# Patient Record
Sex: FEMALE | Race: BLACK OR AFRICAN AMERICAN | NOT HISPANIC OR LATINO | Employment: FULL TIME | URBAN - METROPOLITAN AREA
[De-identification: names, ages, dates, MRNs, and addresses within clinical notes are randomized per-mention and may not be internally consistent; named-entity substitution may affect disease eponyms.]

---

## 2017-05-19 ENCOUNTER — APPOINTMENT (OUTPATIENT)
Dept: ULTRASOUND IMAGING | Facility: HOSPITAL | Age: 52
End: 2017-05-19

## 2018-02-13 ENCOUNTER — TRANSCRIBE ORDERS (OUTPATIENT)
Dept: ADMINISTRATIVE | Facility: HOSPITAL | Age: 53
End: 2018-02-13

## 2018-02-13 DIAGNOSIS — N63.13 BREAST LUMP ON RIGHT SIDE AT 7 O'CLOCK POSITION: Primary | ICD-10-CM

## 2018-02-23 ENCOUNTER — HOSPITAL ENCOUNTER (OUTPATIENT)
Dept: ULTRASOUND IMAGING | Facility: HOSPITAL | Age: 53
Discharge: HOME | End: 2018-02-23

## 2018-02-23 ENCOUNTER — HOSPITAL ENCOUNTER (OUTPATIENT)
Dept: MAMMOGRAPHY | Facility: HOSPITAL | Age: 53
Discharge: HOME | End: 2018-02-23
Admitting: INTERNAL MEDICINE

## 2018-02-23 DIAGNOSIS — N63.13 BREAST LUMP ON RIGHT SIDE AT 7 O'CLOCK POSITION: ICD-10-CM

## 2018-02-23 PROCEDURE — 76642 ULTRASOUND BREAST LIMITED: CPT | Performed by: RADIOLOGY

## 2018-02-23 PROCEDURE — 77066 DX MAMMO INCL CAD BI: CPT

## 2018-02-23 PROCEDURE — G0279 TOMOSYNTHESIS, MAMMO: HCPCS

## 2018-02-23 PROCEDURE — 77062 BREAST TOMOSYNTHESIS BI: CPT | Performed by: RADIOLOGY

## 2018-02-23 PROCEDURE — 77066 DX MAMMO INCL CAD BI: CPT | Performed by: RADIOLOGY

## 2018-02-23 PROCEDURE — 76642 ULTRASOUND BREAST LIMITED: CPT

## 2018-03-05 ENCOUNTER — TRANSCRIBE ORDERS (OUTPATIENT)
Dept: MAMMOGRAPHY | Facility: HOSPITAL | Age: 53
End: 2018-03-05

## 2018-03-05 DIAGNOSIS — R92.8 ABNORMAL MAMMOGRAM: Primary | ICD-10-CM

## 2018-03-19 ENCOUNTER — TRANSCRIBE ORDERS (OUTPATIENT)
Dept: MAMMOGRAPHY | Facility: HOSPITAL | Age: 53
End: 2018-03-19

## 2018-03-19 DIAGNOSIS — R92.8 ABNORMAL MAMMOGRAM: Primary | ICD-10-CM

## 2018-03-20 ENCOUNTER — HOSPITAL ENCOUNTER (OUTPATIENT)
Dept: ULTRASOUND IMAGING | Facility: HOSPITAL | Age: 53
Discharge: HOME | End: 2018-03-20
Attending: SURGERY | Admitting: SURGERY

## 2018-03-20 ENCOUNTER — HOSPITAL ENCOUNTER (OUTPATIENT)
Dept: ULTRASOUND IMAGING | Facility: HOSPITAL | Age: 53
Discharge: HOME | End: 2018-03-20

## 2018-03-20 DIAGNOSIS — R92.8 ABNORMAL MAMMOGRAM: ICD-10-CM

## 2018-03-20 PROCEDURE — 88189 FLOWCYTOMETRY/READ 16 & >: CPT

## 2018-03-20 PROCEDURE — 88185 FLOWCYTOMETRY/TC ADD-ON: CPT

## 2018-03-20 PROCEDURE — 76942 ECHO GUIDE FOR BIOPSY: CPT

## 2018-03-20 PROCEDURE — 88305 TISSUE EXAM BY PATHOLOGIST: CPT | Performed by: SURGERY

## 2018-03-20 PROCEDURE — 88184 FLOWCYTOMETRY/ TC 1 MARKER: CPT

## 2018-03-20 PROCEDURE — 88112 CYTOPATH CELL ENHANCE TECH: CPT | Performed by: SURGERY

## 2018-03-20 PROCEDURE — 76642 ULTRASOUND BREAST LIMITED: CPT

## 2018-03-20 PROCEDURE — 76642 ULTRASOUND BREAST LIMITED: CPT | Performed by: RADIOLOGY

## 2018-03-20 PROCEDURE — 10022 US GUIDED FINE NEEDLE ASPIRATION: CPT | Performed by: RADIOLOGY

## 2018-03-20 PROCEDURE — 76942 ECHO GUIDE FOR BIOPSY: CPT | Performed by: RADIOLOGY

## 2018-03-20 RX ORDER — LIDOCAINE HYDROCHLORIDE 10 MG/ML
5 INJECTION, SOLUTION INFILTRATION; PERINEURAL ONCE
Status: COMPLETED | OUTPATIENT
Start: 2018-03-20 | End: 2018-03-20

## 2018-03-20 RX ADMIN — LIDOCAINE HYDROCHLORIDE 2 ML: 10 INJECTION, SOLUTION INFILTRATION; PERINEURAL at 14:56

## 2018-03-22 ENCOUNTER — APPOINTMENT (OUTPATIENT)
Dept: PREADMISSION TESTING | Facility: HOSPITAL | Age: 53
End: 2018-03-22

## 2018-03-22 LAB
HCT VFR BLD AUTO: 40.2 % (ref 34.5–44)
LAB AP CASE REPORT: NORMAL
LAB AP FLOW CYTOMETRY SUMMARY: NORMAL
Lab: NORMAL
PATH REPORT.FINAL DX SPEC: NORMAL
POTASSIUM BLD-SCNC: 3.9 MMOL/L (ref 3.5–5.5)

## 2018-03-22 PROCEDURE — 93005 ELECTROCARDIOGRAM TRACING: CPT

## 2018-03-22 PROCEDURE — 93010 ELECTROCARDIOGRAM REPORT: CPT | Performed by: INTERNAL MEDICINE

## 2018-03-22 PROCEDURE — 85014 HEMATOCRIT: CPT | Performed by: ANESTHESIOLOGY

## 2018-03-22 PROCEDURE — 84132 ASSAY OF SERUM POTASSIUM: CPT | Performed by: ANESTHESIOLOGY

## 2018-03-22 PROCEDURE — 36415 COLL VENOUS BLD VENIPUNCTURE: CPT

## 2018-03-23 ENCOUNTER — TELEPHONE (OUTPATIENT)
Dept: MAMMOGRAPHY | Facility: HOSPITAL | Age: 53
End: 2018-03-23

## 2018-03-23 NOTE — TELEPHONE ENCOUNTER
Pt notified of cytology & flow cytometry results and recommendation. Verbalizes understanding. Denies discomfort. Denies any signs and symptoms of infection.

## 2018-03-27 ENCOUNTER — HOSPITAL ENCOUNTER (OUTPATIENT)
Dept: ULTRASOUND IMAGING | Facility: HOSPITAL | Age: 53
Discharge: HOME | End: 2018-03-27

## 2018-03-27 ENCOUNTER — HOSPITAL ENCOUNTER (OUTPATIENT)
Dept: MAMMOGRAPHY | Facility: HOSPITAL | Age: 53
Discharge: HOME | End: 2018-03-27

## 2018-03-27 ENCOUNTER — HOSPITAL ENCOUNTER (OUTPATIENT)
Dept: MAMMOGRAPHY | Facility: HOSPITAL | Age: 53
Discharge: HOME | End: 2018-03-27
Attending: SURGERY | Admitting: SURGERY

## 2018-03-27 ENCOUNTER — HOSPITAL ENCOUNTER (OUTPATIENT)
Dept: MAMMOGRAPHY | Facility: HOSPITAL | Age: 53
Discharge: HOME | End: 2018-03-27
Attending: SURGERY

## 2018-03-27 ENCOUNTER — LAB REQUISITION (OUTPATIENT)
Dept: LAB | Facility: HOSPITAL | Age: 53
End: 2018-03-27

## 2018-03-27 DIAGNOSIS — R92.8 ABNORMAL MAMMOGRAM: ICD-10-CM

## 2018-03-27 DIAGNOSIS — R92.8 OTHER ABNORMAL AND INCONCLUSIVE FINDINGS ON DIAGNOSTIC IMAGING OF BREAST: ICD-10-CM

## 2018-03-27 PROCEDURE — 77065 DX MAMMO INCL CAD UNI: CPT | Performed by: RADIOLOGY

## 2018-03-27 PROCEDURE — 76098 X-RAY EXAM SURGICAL SPECIMEN: CPT

## 2018-03-27 PROCEDURE — 19285 PERQ DEV BREAST 1ST US IMAG: CPT | Performed by: RADIOLOGY

## 2018-03-27 PROCEDURE — 76098 X-RAY EXAM SURGICAL SPECIMEN: CPT | Performed by: RADIOLOGY

## 2018-03-27 PROCEDURE — 19286 PERQ DEV BREAST ADD US IMAG: CPT | Performed by: RADIOLOGY

## 2018-03-27 PROCEDURE — 88305 TISSUE EXAM BY PATHOLOGIST: CPT | Performed by: SURGERY

## 2018-03-27 RX ORDER — LIDOCAINE HYDROCHLORIDE 10 MG/ML
5 INJECTION, SOLUTION INFILTRATION; PERINEURAL ONCE
Status: COMPLETED | OUTPATIENT
Start: 2018-03-27 | End: 2018-03-27

## 2018-03-27 RX ORDER — LIDOCAINE HYDROCHLORIDE 10 MG/ML
5 INJECTION, SOLUTION INFILTRATION; PERINEURAL ONCE
Status: SHIPPED | OUTPATIENT
Start: 2018-03-27

## 2018-03-27 RX ADMIN — METHYLENE BLUE 10 MG: 10 INJECTION INTRAVENOUS at 09:22

## 2018-03-27 RX ADMIN — LIDOCAINE HYDROCHLORIDE 2 ML: 10 INJECTION, SOLUTION INFILTRATION; PERINEURAL at 09:21

## 2018-03-27 RX ADMIN — METHYLENE BLUE 10 MG: 10 INJECTION INTRAVENOUS at 09:20

## 2018-03-27 RX ADMIN — LIDOCAINE HYDROCHLORIDE 2 ML: 10 INJECTION, SOLUTION INFILTRATION; PERINEURAL at 09:19

## 2018-03-28 LAB
CYTO UR: NORMAL
LAB AP CASE REPORT: NORMAL
LAB AP CLINICAL INFORMATION: NORMAL
LAB AP DIAGNOSIS COMMENT: NORMAL
Lab: NORMAL
PATH REPORT.FINAL DX SPEC: NORMAL
PATH REPORT.GROSS SPEC: NORMAL

## 2018-04-09 ENCOUNTER — TRANSCRIBE ORDERS (OUTPATIENT)
Dept: MAMMOGRAPHY | Facility: HOSPITAL | Age: 53
End: 2018-04-09

## 2018-04-09 DIAGNOSIS — R92.8 ABNORMAL MAMMOGRAM: Primary | ICD-10-CM

## 2018-04-22 ENCOUNTER — HOSPITAL ENCOUNTER (EMERGENCY)
Facility: HOSPITAL | Age: 53
Discharge: HOME | End: 2018-04-22
Attending: EMERGENCY MEDICINE | Admitting: EMERGENCY MEDICINE

## 2018-04-22 VITALS
DIASTOLIC BLOOD PRESSURE: 74 MMHG | HEART RATE: 79 BPM | RESPIRATION RATE: 16 BRPM | OXYGEN SATURATION: 100 % | WEIGHT: 164 LBS | TEMPERATURE: 98.5 F | HEIGHT: 63 IN | BODY MASS INDEX: 29.06 KG/M2 | SYSTOLIC BLOOD PRESSURE: 128 MMHG

## 2018-04-22 DIAGNOSIS — T63.461A WASP STING, ACCIDENTAL OR UNINTENTIONAL, INITIAL ENCOUNTER: Primary | ICD-10-CM

## 2018-04-22 DIAGNOSIS — I10 ESSENTIAL HYPERTENSION: ICD-10-CM

## 2018-04-22 PROCEDURE — 99283 EMERGENCY DEPT VISIT LOW MDM: CPT

## 2018-04-22 RX ORDER — DIPHENHYDRAMINE HCL 50 MG
50 CAPSULE ORAL EVERY 6 HOURS PRN
Qty: 12 CAPSULE | Refills: 0 | Status: SHIPPED | OUTPATIENT
Start: 2018-04-22 | End: 2022-07-11

## 2018-04-22 RX ORDER — CHLORTHALIDONE 25 MG/1
20 TABLET ORAL DAILY
COMMUNITY

## 2018-04-22 RX ORDER — LISINOPRIL 10 MG/1
10 TABLET ORAL DAILY
COMMUNITY

## 2018-04-22 RX ORDER — OMEPRAZOLE 20 MG/1
20 CAPSULE, DELAYED RELEASE ORAL DAILY
COMMUNITY

## 2018-04-22 NOTE — DISCHARGE INSTRUCTIONS
Cold compresses every few hours for 2030 minutes.  Benadryl 50 mg every 6 hours as needed for itching and swelling.  Return to the emergency department immediately if any change or worsening symptoms

## 2018-04-22 NOTE — ED PROVIDER NOTES
Subjective   52-year-old female presents to emergency department with wasp sting to left hand, between her ring and little fingers.  Initially became red and swollen and itchy, swelling has since subsided, now slightly painful, no lymphangitic streaking, denies other symptoms or injuries at this time.  No shortness of breath wheezing or respiratory distress sore throat or syncope.        History provided by:  Patient  Insect Bite   Contact animal:  Insect  Location:  Hand  Hand injury location:  L hand  Time since incident:  2 hours  Pain details:     Quality:  Aching, itching and localized    Severity:  Mild    Timing:  Constant    Progression:  Improving  Incident location: [facility].  Provoked: unprovoked    Animal in possession: no    Tetanus status:  Out of date  Relieved by:  Nothing  Worsened by:  Nothing  Ineffective treatments:  None tried  Associated symptoms: swelling    Associated symptoms: no fever, no numbness and no rash        Review of Systems   Constitutional: Negative for diaphoresis and fever.   HENT: Negative for congestion and sore throat.    Respiratory: Negative for cough, choking, chest tightness, shortness of breath and wheezing.    Cardiovascular: Negative for chest pain and leg swelling.   Gastrointestinal: Negative for abdominal distention, abdominal pain, diarrhea, nausea and vomiting.   Skin: Negative for rash.        Per history of present illness   Neurological: Negative for numbness.   All other systems reviewed and are negative.      Past Medical History:   Diagnosis Date   • GERD (gastroesophageal reflux disease)    • Hypertension        Allergies   Allergen Reactions   • Aspirin Dystonia   • Codeine Paresthesia   • Morphine And Related Mental Status Change       Past Surgical History:   Procedure Laterality Date   • BREAST BIOPSY Right 2016    us core rt 10:00   • BREAST CYST EXCISION Bilateral    • HYSTERECTOMY     • OOPHORECTOMY     • US GUIDED FINE NEEDLE ASPIRATION  3/20/2018        Family History   Problem Relation Age of Onset   • Breast cancer Maternal Grandmother 53   • Ovarian cancer Neg Hx        Social History     Social History   • Marital status:      Social History Main Topics   • Smoking status: Never Smoker   • Alcohol use No   • Drug use: No   • Sexual activity: Defer     Other Topics Concern   • Not on file           Objective   Physical Exam   Constitutional: She is oriented to person, place, and time. She appears well-developed and well-nourished. No distress.   HENT:   Head: Normocephalic and atraumatic.   Right Ear: External ear normal.   Left Ear: External ear normal.   Nose: Nose normal.   Mouth/Throat: Oropharynx is clear and moist. No oropharyngeal exudate.   Eyes: Conjunctivae and EOM are normal. Pupils are equal, round, and reactive to light. Right eye exhibits no discharge. Left eye exhibits no discharge. No scleral icterus.   Neck: Normal range of motion. Neck supple. No JVD present. No tracheal deviation present. No thyromegaly present.   Cardiovascular: Normal rate.    Pulmonary/Chest: Effort normal. No stridor. No respiratory distress.   Abdominal: Soft. She exhibits no distension.   Musculoskeletal: Normal range of motion. She exhibits tenderness. She exhibits no edema or deformity.   Localize moderate soft tissue swelling to dorsum of left hand between fourth and fifth digits.  No lymphangitic streaking.  Neurovascular status is intact distally.  Close examination under magnification reveals no evidence of stinger retained in skin.   Neurological: She is alert and oriented to person, place, and time. No cranial nerve deficit. She exhibits normal muscle tone. Coordination normal.   Skin: Skin is warm and dry. No rash noted. She is not diaphoretic. No erythema. No pallor.   Psychiatric: She has a normal mood and affect. Her behavior is normal. Judgment and thought content normal.   Nursing note and vitals reviewed.      Procedures        No results  "found for this or any previous visit (from the past 24 hour(s)).  Note: In addition to lab results from this visit, the labs listed above may include labs taken at another facility or during a different encounter within the last 24 hours. Please correlate lab times with ED admission and discharge times for further clarification of the services performed during this visit.    No orders to display     Vitals:    04/22/18 1355   BP: 128/74   Pulse: 79   Resp: 16   Temp: 98.5 °F (36.9 °C)   SpO2: 100%   Weight: 74.4 kg (164 lb)   Height: 160 cm (63\")     Medications - No data to display  ECG/EMG Results (last 24 hours)     ** No results found for the last 24 hours. **          ED Course  ED Course                  MDM    Final diagnoses:   Wasp sting, accidental or unintentional, initial encounter   Essential hypertension            Tyrone Shaikh PA-C  04/23/18 1302    "

## 2019-08-05 DIAGNOSIS — Z12.11 SCREENING FOR COLON CANCER: Primary | ICD-10-CM

## 2019-08-19 ENCOUNTER — OUTSIDE FACILITY SERVICE (OUTPATIENT)
Dept: GASTROENTEROLOGY | Facility: CLINIC | Age: 54
End: 2019-08-19

## 2019-08-19 PROCEDURE — 45378 DIAGNOSTIC COLONOSCOPY: CPT | Performed by: INTERNAL MEDICINE

## 2020-12-06 PROCEDURE — U0004 COV-19 TEST NON-CDC HGH THRU: HCPCS | Performed by: PHYSICIAN ASSISTANT

## 2020-12-08 ENCOUNTER — APPOINTMENT (OUTPATIENT)
Dept: GENERAL RADIOLOGY | Facility: HOSPITAL | Age: 55
End: 2020-12-08

## 2020-12-08 ENCOUNTER — HOSPITAL ENCOUNTER (EMERGENCY)
Facility: HOSPITAL | Age: 55
Discharge: HOME | End: 2020-12-08
Attending: EMERGENCY MEDICINE | Admitting: EMERGENCY MEDICINE

## 2020-12-08 VITALS
WEIGHT: 140 LBS | RESPIRATION RATE: 20 BRPM | SYSTOLIC BLOOD PRESSURE: 121 MMHG | OXYGEN SATURATION: 99 % | BODY MASS INDEX: 24.8 KG/M2 | TEMPERATURE: 99 F | DIASTOLIC BLOOD PRESSURE: 81 MMHG | HEART RATE: 69 BPM | HEIGHT: 63 IN

## 2020-12-08 DIAGNOSIS — U07.1 ACUTE BRONCHITIS DUE TO COVID-19 VIRUS: Primary | ICD-10-CM

## 2020-12-08 DIAGNOSIS — J20.8 ACUTE BRONCHITIS DUE TO COVID-19 VIRUS: Primary | ICD-10-CM

## 2020-12-08 DIAGNOSIS — E87.6 HYPOKALEMIA: ICD-10-CM

## 2020-12-08 LAB
ALBUMIN SERPL-MCNC: 4.1 G/DL (ref 3.5–5.2)
ALBUMIN/GLOB SERPL: 1.1 G/DL
ALP SERPL-CCNC: 36 U/L (ref 39–117)
ALT SERPL W P-5'-P-CCNC: 18 U/L (ref 1–33)
ANION GAP SERPL CALCULATED.3IONS-SCNC: 9 MMOL/L (ref 5–15)
AST SERPL-CCNC: 19 U/L (ref 1–32)
BASOPHILS # BLD AUTO: 0.01 10*3/MM3 (ref 0–0.2)
BASOPHILS NFR BLD AUTO: 0.2 % (ref 0–1.5)
BILIRUB SERPL-MCNC: 0.3 MG/DL (ref 0–1.2)
BUN SERPL-MCNC: 16 MG/DL (ref 6–20)
BUN/CREAT SERPL: 20.5 (ref 7–25)
CALCIUM SPEC-SCNC: 9.6 MG/DL (ref 8.6–10.5)
CHLORIDE SERPL-SCNC: 100 MMOL/L (ref 98–107)
CO2 SERPL-SCNC: 31 MMOL/L (ref 22–29)
CREAT SERPL-MCNC: 0.78 MG/DL (ref 0.57–1)
DEPRECATED RDW RBC AUTO: 40.1 FL (ref 37–54)
EOSINOPHIL # BLD AUTO: 0.02 10*3/MM3 (ref 0–0.4)
EOSINOPHIL NFR BLD AUTO: 0.3 % (ref 0.3–6.2)
ERYTHROCYTE [DISTWIDTH] IN BLOOD BY AUTOMATED COUNT: 11.9 % (ref 12.3–15.4)
GFR SERPL CREATININE-BSD FRML MDRD: 94 ML/MIN/1.73
GLOBULIN UR ELPH-MCNC: 3.9 GM/DL
GLUCOSE SERPL-MCNC: 99 MG/DL (ref 65–99)
HCT VFR BLD AUTO: 40.4 % (ref 34–46.6)
HGB BLD-MCNC: 13.4 G/DL (ref 12–15.9)
HOLD SPECIMEN: NORMAL
HOLD SPECIMEN: NORMAL
IMM GRANULOCYTES # BLD AUTO: 0.02 10*3/MM3 (ref 0–0.05)
IMM GRANULOCYTES NFR BLD AUTO: 0.3 % (ref 0–0.5)
LYMPHOCYTES # BLD AUTO: 1.72 10*3/MM3 (ref 0.7–3.1)
LYMPHOCYTES NFR BLD AUTO: 28.2 % (ref 19.6–45.3)
MCH RBC QN AUTO: 30.7 PG (ref 26.6–33)
MCHC RBC AUTO-ENTMCNC: 33.2 G/DL (ref 31.5–35.7)
MCV RBC AUTO: 92.4 FL (ref 79–97)
MONOCYTES # BLD AUTO: 0.36 10*3/MM3 (ref 0.1–0.9)
MONOCYTES NFR BLD AUTO: 5.9 % (ref 5–12)
NEUTROPHILS NFR BLD AUTO: 3.96 10*3/MM3 (ref 1.7–7)
NEUTROPHILS NFR BLD AUTO: 65.1 % (ref 42.7–76)
NRBC BLD AUTO-RTO: 0 /100 WBC (ref 0–0.2)
NT-PROBNP SERPL-MCNC: <5 PG/ML (ref 0–900)
PLATELET # BLD AUTO: 269 10*3/MM3 (ref 140–450)
PMV BLD AUTO: 10.1 FL (ref 6–12)
POTASSIUM SERPL-SCNC: 3.1 MMOL/L (ref 3.5–5.2)
PROT SERPL-MCNC: 8 G/DL (ref 6–8.5)
QT INTERVAL: 370 MS
QTC INTERVAL: 418 MS
RBC # BLD AUTO: 4.37 10*6/MM3 (ref 3.77–5.28)
SODIUM SERPL-SCNC: 140 MMOL/L (ref 136–145)
TROPONIN T SERPL-MCNC: <0.01 NG/ML (ref 0–0.03)
WBC # BLD AUTO: 6.09 10*3/MM3 (ref 3.4–10.8)
WHOLE BLOOD HOLD SPECIMEN: NORMAL
WHOLE BLOOD HOLD SPECIMEN: NORMAL

## 2020-12-08 PROCEDURE — 85025 COMPLETE CBC W/AUTO DIFF WBC: CPT

## 2020-12-08 PROCEDURE — 99283 EMERGENCY DEPT VISIT LOW MDM: CPT

## 2020-12-08 PROCEDURE — 83880 ASSAY OF NATRIURETIC PEPTIDE: CPT

## 2020-12-08 PROCEDURE — 93005 ELECTROCARDIOGRAM TRACING: CPT

## 2020-12-08 PROCEDURE — 84484 ASSAY OF TROPONIN QUANT: CPT

## 2020-12-08 PROCEDURE — 80053 COMPREHEN METABOLIC PANEL: CPT

## 2020-12-08 PROCEDURE — 71045 X-RAY EXAM CHEST 1 VIEW: CPT

## 2020-12-08 RX ORDER — SODIUM CHLORIDE 0.9 % (FLUSH) 0.9 %
10 SYRINGE (ML) INJECTION AS NEEDED
Status: DISCONTINUED | OUTPATIENT
Start: 2020-12-08 | End: 2020-12-08 | Stop reason: HOSPADM

## 2020-12-08 RX ORDER — METHYLPREDNISOLONE 4 MG/1
TABLET ORAL
Qty: 1 EACH | Refills: 0 | Status: SHIPPED | OUTPATIENT
Start: 2020-12-08 | End: 2022-07-11

## 2020-12-08 RX ORDER — POTASSIUM CHLORIDE 750 MG/1
40 CAPSULE, EXTENDED RELEASE ORAL ONCE
Status: COMPLETED | OUTPATIENT
Start: 2020-12-08 | End: 2020-12-08

## 2020-12-08 RX ORDER — POTASSIUM CHLORIDE 750 MG/1
10 TABLET, FILM COATED, EXTENDED RELEASE ORAL DAILY
Qty: 6 TABLET | Refills: 0 | Status: SHIPPED | OUTPATIENT
Start: 2020-12-08 | End: 2020-12-14

## 2020-12-08 RX ADMIN — POTASSIUM CHLORIDE 40 MEQ: 10 CAPSULE, COATED, EXTENDED RELEASE ORAL at 17:40

## 2020-12-08 NOTE — ED PROVIDER NOTES
Subjective   Pt is a 56 yo female presenting to ED with complaints of SOB. PMHx significant for GERD, HTN and Asthma. Pt complains of SOB, non productive cough, chest tightness with coughing, generalized weakness and chills for 4 days. Pt was diagnosed with Covid yesterday. She denies dizziness, headache, fever, sore throat, nasal congestion, N/V/D, abdominal pain, flank pain, urinary sx, leg swelling or loss of taste/smell. She denies any antibiotics, steroids, Tylenol or Motrin. She denies tobacco, drug or ETOH use. 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       History provided by:  Patient and medical records      Review of Systems   Constitutional: Positive for chills. Negative for fever.   HENT: Negative for congestion, sore throat and trouble swallowing.    Eyes: Negative for visual disturbance.   Respiratory: Positive for cough, chest tightness and shortness of breath.    Cardiovascular: Negative for chest pain and leg swelling.   Gastrointestinal: Negative for abdominal pain, constipation, diarrhea, nausea and vomiting.   Genitourinary: Negative for difficulty urinating, dysuria, flank pain and hematuria.   Musculoskeletal: Negative for arthralgias, back pain and joint swelling.   Skin: Negative for rash and wound.   Neurological: Positive for weakness (generalized). Negative for dizziness, syncope, speech difficulty, numbness and headaches.   Psychiatric/Behavioral: Negative for confusion.   All other systems reviewed and are negative.      Past Medical History:   Diagnosis Date   • GERD (gastroesophageal reflux disease)    • Hypertension        Allergies   Allergen Reactions   • Aspirin Dystonia   • Codeine Paresthesia   • Morphine And Related Mental Status Change       Past Surgical History:   Procedure Laterality Date   • BREAST BIOPSY Right 2016    us core rt 10:00   • BREAST CYST EXCISION Bilateral    • HYSTERECTOMY     • OOPHORECTOMY     • US GUIDED FINE NEEDLE  ASPIRATION  3/20/2018       Family History   Problem Relation Age of Onset   • Breast cancer Maternal Grandmother 53   • Ovarian cancer Neg Hx        Social History     Socioeconomic History   • Marital status:      Spouse name: Not on file   • Number of children: Not on file   • Years of education: Not on file   • Highest education level: Not on file   Tobacco Use   • Smoking status: Never Smoker   • Smokeless tobacco: Never Used   Substance and Sexual Activity   • Alcohol use: No   • Drug use: No   • Sexual activity: Defer           Objective   Physical Exam  Vitals signs and nursing note reviewed.   Constitutional:       Appearance: She is well-developed.   HENT:      Head: Atraumatic.      Nose: Nose normal.   Eyes:      General: Lids are normal.      Conjunctiva/sclera: Conjunctivae normal.      Pupils: Pupils are equal, round, and reactive to light.   Neck:      Musculoskeletal: Normal range of motion and neck supple.   Cardiovascular:      Rate and Rhythm: Normal rate and regular rhythm.      Heart sounds: Normal heart sounds.   Pulmonary:      Effort: Pulmonary effort is normal.      Breath sounds: Normal breath sounds. No wheezing.   Abdominal:      General: There is no distension.      Palpations: Abdomen is soft.      Tenderness: There is no abdominal tenderness. There is no guarding or rebound.   Musculoskeletal: Normal range of motion.         General: No tenderness.   Skin:     General: Skin is warm and dry.      Findings: No erythema or rash.   Neurological:      Mental Status: She is alert and oriented to person, place, and time.      Sensory: No sensory deficit.   Psychiatric:         Speech: Speech normal.         Behavior: Behavior normal.         Procedures           ED Course  ED Course as of Dec 08 1811   Tue Dec 08, 2020   1659 SpO2: 99 % [RT]   1659 Device (Oxygen Therapy): room air [RT]   1748 SpO2: 99 % [RT]   1748 Device (Oxygen Therapy): room air [RT]      ED Course User  Index  [RT] Jessica Hernandez PA      Pt with recent diagnosis of Covid. Vitals stable on RA. Labs unremarkable othr than Potassium 3.1 and replaced in ED. Discussed tx plan with patient and she is agreeable. Will dc home with pulse ox and Medrol dose pack. She already has Albuterol inhaler. She understands to return to ED if new or worse sx and if O2 less than 92%.     Reviewed old records.     Recent Results (from the past 24 hour(s))   ECG 12 Lead    Collection Time: 12/08/20  1:28 PM   Result Value Ref Range    QT Interval 370 ms    QTC Interval 418 ms   Comprehensive Metabolic Panel    Collection Time: 12/08/20  1:34 PM    Specimen: Blood   Result Value Ref Range    Glucose 99 65 - 99 mg/dL    BUN 16 6 - 20 mg/dL    Creatinine 0.78 0.57 - 1.00 mg/dL    Sodium 140 136 - 145 mmol/L    Potassium 3.1 (L) 3.5 - 5.2 mmol/L    Chloride 100 98 - 107 mmol/L    CO2 31.0 (H) 22.0 - 29.0 mmol/L    Calcium 9.6 8.6 - 10.5 mg/dL    Total Protein 8.0 6.0 - 8.5 g/dL    Albumin 4.10 3.50 - 5.20 g/dL    ALT (SGPT) 18 1 - 33 U/L    AST (SGOT) 19 1 - 32 U/L    Alkaline Phosphatase 36 (L) 39 - 117 U/L    Total Bilirubin 0.3 0.0 - 1.2 mg/dL    eGFR  African Amer 94 >60 mL/min/1.73    Globulin 3.9 gm/dL    A/G Ratio 1.1 g/dL    BUN/Creatinine Ratio 20.5 7.0 - 25.0    Anion Gap 9.0 5.0 - 15.0 mmol/L   BNP    Collection Time: 12/08/20  1:34 PM    Specimen: Blood   Result Value Ref Range    proBNP <5.0 0.0 - 900.0 pg/mL   Troponin    Collection Time: 12/08/20  1:34 PM    Specimen: Blood   Result Value Ref Range    Troponin T <0.010 0.000 - 0.030 ng/mL   Light Blue Top    Collection Time: 12/08/20  1:34 PM   Result Value Ref Range    Extra Tube hold for add-on    Green Top (Gel)    Collection Time: 12/08/20  1:34 PM   Result Value Ref Range    Extra Tube Hold for add-ons.    Lavender Top    Collection Time: 12/08/20  1:34 PM   Result Value Ref Range    Extra Tube hold for add-on    Gold Top - SST    Collection Time: 12/08/20  1:34 PM  "  Result Value Ref Range    Extra Tube Hold for add-ons.    CBC Auto Differential    Collection Time: 12/08/20  1:34 PM    Specimen: Blood   Result Value Ref Range    WBC 6.09 3.40 - 10.80 10*3/mm3    RBC 4.37 3.77 - 5.28 10*6/mm3    Hemoglobin 13.4 12.0 - 15.9 g/dL    Hematocrit 40.4 34.0 - 46.6 %    MCV 92.4 79.0 - 97.0 fL    MCH 30.7 26.6 - 33.0 pg    MCHC 33.2 31.5 - 35.7 g/dL    RDW 11.9 (L) 12.3 - 15.4 %    RDW-SD 40.1 37.0 - 54.0 fl    MPV 10.1 6.0 - 12.0 fL    Platelets 269 140 - 450 10*3/mm3    Neutrophil % 65.1 42.7 - 76.0 %    Lymphocyte % 28.2 19.6 - 45.3 %    Monocyte % 5.9 5.0 - 12.0 %    Eosinophil % 0.3 0.3 - 6.2 %    Basophil % 0.2 0.0 - 1.5 %    Immature Grans % 0.3 0.0 - 0.5 %    Neutrophils, Absolute 3.96 1.70 - 7.00 10*3/mm3    Lymphocytes, Absolute 1.72 0.70 - 3.10 10*3/mm3    Monocytes, Absolute 0.36 0.10 - 0.90 10*3/mm3    Eosinophils, Absolute 0.02 0.00 - 0.40 10*3/mm3    Basophils, Absolute 0.01 0.00 - 0.20 10*3/mm3    Immature Grans, Absolute 0.02 0.00 - 0.05 10*3/mm3    nRBC 0.0 0.0 - 0.2 /100 WBC     Note: In addition to lab results from this visit, the labs listed above may include labs taken at another facility or during a different encounter within the last 24 hours. Please correlate lab times with ED admission and discharge times for further clarification of the services performed during this visit.    XR Chest 1 View   Preliminary Result   No evidence of active chest disease in particular, no   evidence of pneumonia is seen.       D:  12/08/2020   E:  12/08/2020                 Vitals:    12/08/20 1254 12/08/20 1746   BP: 118/85 121/81   BP Location: Left arm Left arm   Patient Position: Sitting Sitting   Pulse: 85 69   Resp: 20 20   Temp: 96.8 °F (36 °C) 99 °F (37.2 °C)   TempSrc: Infrared Oral   SpO2: 99% 99%   Weight: 63.5 kg (140 lb)    Height: 160 cm (63\")      Medications   sodium chloride 0.9 % flush 10 mL (has no administration in time range)   potassium chloride (MICRO-K) " CR capsule 40 mEq (40 mEq Oral Given 12/8/20 1740)     ECG/EMG Results (last 24 hours)     Procedure Component Value Units Date/Time    ECG 12 Lead [[id number]] Collected: 12/08/20 1328     Updated: 12/08/20 1728     QT Interval 370 ms      QTC Interval 418 ms     Narrative:      Test Reason : SOA Protocol  Blood Pressure : **/** mmHG  Vent. Rate : 077 BPM     Atrial Rate : 077 BPM     P-R Int : 174 ms          QRS Dur : 098 ms      QT Int : 370 ms       P-R-T Axes : 049 000 038 degrees     QTc Int : 418 ms    Normal sinus rhythm  Normal ECG  When compared with ECG of 22-MAR-2018 10:40,  No significant change was found  Confirmed by JUSTEN NUR MD ([id number]) on 12/8/2020 5:28:46 PM    Referred By:  AGUILAR           Confirmed By:JUSTEN NUR MD        ECG 12 Lead   Final Result   Test Reason : SOA Protocol   Blood Pressure : **/** mmHG   Vent. Rate : 077 BPM     Atrial Rate : 077 BPM      P-R Int : 174 ms          QRS Dur : 098 ms       QT Int : 370 ms       P-R-T Axes : 049 000 038 degrees      QTc Int : 418 ms      Normal sinus rhythm   Normal ECG   When compared with ECG of 22-MAR-2018 10:40,   No significant change was found   Confirmed by JUSTEN NUR MD ([id number]) on 12/8/2020 5:28:46 PM      Referred By:  AGUILAR           Confirmed By:JUSTEN NUR MD            COVID-19 RISK SCREEN     1. Has the patient had close contact without PPE with a lab confirmed COVID-19 (+) person or a person under investigation (PUI) for COVID-19 infection?  -- No     2. Has the patient had respiratory symptoms, worsened/new cough and/or SOA, unexplained fever, or sudden loss of smell and/or taste in the past 7 days? --  Yes    3. Does the patient have baseline higher exposure risk such as working in healthcare field, currently residing in healthcare facility, or ongoing hemodialysis?  --  No     DISCHARGE    Patient discharged in stable condition.    Reviewed implications of results, diagnosis, meds, responsibility to follow up,  warning signs and symptoms of possible worsening, potential complications and reasons to return to ER.    Patient/Family voiced understanding of above instructions.    Discussed plan for discharge, as there is no emergent indication for admission.  Pt/family is agreeable and understands need for follow up and possible repeat testing.  Pt/family is aware that discharge does not mean that nothing is wrong but that it indicates no emergency is currently present that requires admission and they must continue care with follow-up as given below or with a physician of their choice.     FOLLOW-UP  Lizandro Ashford, APRN  [address]  [place] [address]  [phone]    Schedule an appointment as soon as possible for a visit       [facility] Emergency Department  [address] [facility] [facility] [address] [phone]    If symptoms worsen         Medication List      New Prescriptions    methylPREDNISolone 4 MG dose pack  Commonly known as: MEDROL  Take as directed on package instructions.     potassium chloride 10 MEQ CR tablet  Take 1 tablet by mouth Daily for 6 days.           Where to Get Your Medications      These medications were sent to [id number] - [place], [place] - [address] [place] [address] - [phone]  - [phone] FX  [address] [place], [place] [address]    Phone: [phone]   · methylPREDNISolone 4 MG dose pack  · potassium chloride 10 MEQ CR tablet                                            MDM    Final diagnoses:   Acute bronchitis due to COVID-19 virus   Hypokalemia            Jessica Hernandez PA  12/08/20 6065

## 2020-12-27 ENCOUNTER — APPOINTMENT (OUTPATIENT)
Dept: PREADMISSION TESTING | Facility: HOSPITAL | Age: 55
End: 2020-12-27

## 2021-04-23 RX ORDER — SODIUM, POTASSIUM,MAG SULFATES 17.5-3.13G
SOLUTION, RECONSTITUTED, ORAL ORAL
Qty: 320 ML | Refills: 0 | Status: SHIPPED | OUTPATIENT
Start: 2021-04-23

## 2021-05-17 ENCOUNTER — OUTSIDE FACILITY SERVICE (OUTPATIENT)
Dept: GASTROENTEROLOGY | Facility: CLINIC | Age: 56
End: 2021-05-17

## 2021-05-17 PROCEDURE — 88305 TISSUE EXAM BY PATHOLOGIST: CPT | Performed by: INTERNAL MEDICINE

## 2021-05-17 PROCEDURE — 88342 IMHCHEM/IMCYTCHM 1ST ANTB: CPT | Performed by: INTERNAL MEDICINE

## 2021-05-17 PROCEDURE — 43239 EGD BIOPSY SINGLE/MULTIPLE: CPT | Performed by: INTERNAL MEDICINE

## 2021-05-18 ENCOUNTER — LAB REQUISITION (OUTPATIENT)
Dept: LAB | Facility: HOSPITAL | Age: 56
End: 2021-05-18

## 2021-05-18 DIAGNOSIS — R10.10 UPPER ABDOMINAL PAIN, UNSPECIFIED: ICD-10-CM

## 2021-05-18 DIAGNOSIS — R10.84 GENERALIZED ABDOMINAL PAIN: ICD-10-CM

## 2021-05-18 DIAGNOSIS — K21.9 GASTRO-ESOPHAGEAL REFLUX DISEASE WITHOUT ESOPHAGITIS: ICD-10-CM

## 2021-05-20 LAB
CYTO UR: NORMAL
LAB AP CASE REPORT: NORMAL
LAB AP CLINICAL INFORMATION: NORMAL
PATH REPORT.FINAL DX SPEC: NORMAL
PATH REPORT.GROSS SPEC: NORMAL

## 2021-05-26 DIAGNOSIS — A04.8 BACTERIAL INFECTION DUE TO HELICOBACTER PYLORI: Primary | ICD-10-CM

## 2021-09-01 ENCOUNTER — TRANSCRIBE ORDERS (OUTPATIENT)
Dept: ADMINISTRATIVE | Facility: HOSPITAL | Age: 56
End: 2021-09-01

## 2021-09-01 DIAGNOSIS — Z12.31 VISIT FOR SCREENING MAMMOGRAM: ICD-10-CM

## 2021-09-01 DIAGNOSIS — Z78.0 POSTMENOPAUSAL: Primary | ICD-10-CM

## 2021-09-30 ENCOUNTER — APPOINTMENT (OUTPATIENT)
Dept: MAMMOGRAPHY | Facility: HOSPITAL | Age: 56
End: 2021-09-30

## 2021-12-06 ENCOUNTER — APPOINTMENT (OUTPATIENT)
Dept: MAMMOGRAPHY | Facility: HOSPITAL | Age: 56
End: 2021-12-06

## 2022-04-14 ENCOUNTER — TELEPHONE (OUTPATIENT)
Dept: NEUROSURGERY | Facility: CLINIC | Age: 57
End: 2022-04-14

## 2022-04-14 NOTE — TELEPHONE ENCOUNTER
A REFERRING OFFICE IS CALLING TO CHECK ON STATUS OF A REFERRAL THAT WAS FAXED LAST WEEK. THE FAX # GIVEN WAS UNKNOWN; I ADVISED MARY TO FAX [phone].

## 2022-07-11 ENCOUNTER — OFFICE VISIT (OUTPATIENT)
Dept: NEUROSURGERY | Facility: CLINIC | Age: 57
End: 2022-07-11

## 2022-07-11 VITALS — HEIGHT: 63 IN | BODY MASS INDEX: 24.81 KG/M2 | TEMPERATURE: 97.7 F

## 2022-07-11 DIAGNOSIS — M51.36 DEGENERATIVE LUMBAR DISC: Primary | ICD-10-CM

## 2022-07-11 PROCEDURE — 99204 OFFICE O/P NEW MOD 45 MIN: CPT | Performed by: PHYSICIAN ASSISTANT
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
## 2022-07-14 ENCOUNTER — TRANSCRIBE ORDERS (OUTPATIENT)
Dept: ADMINISTRATIVE | Facility: HOSPITAL | Age: 57
End: 2022-07-14

## 2022-07-14 ENCOUNTER — HOSPITAL ENCOUNTER (OUTPATIENT)
Dept: GENERAL RADIOLOGY | Facility: HOSPITAL | Age: 57
Discharge: HOME | End: 2022-07-14
Admitting: FAMILY MEDICINE
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
## 2022-07-14 DIAGNOSIS — M51.9 LUMBAR DISC DISORDER: ICD-10-CM

## 2022-07-14 DIAGNOSIS — M79.643 PAIN OF HAND, UNSPECIFIED LATERALITY: ICD-10-CM

## 2022-07-14 PROCEDURE — 72110 X-RAY EXAM L-2 SPINE 4/>VWS: CPT

## 2022-07-18 ENCOUNTER — DOCUMENTATION (OUTPATIENT)
Dept: NEUROSURGERY | Facility: CLINIC | Age: 57
End: 2022-07-18

## 2022-07-18 PROBLEM — M51.369 DEGENERATIVE LUMBAR DISC: Status: ACTIVE | Noted: 2022-07-18

## 2022-07-18 PROBLEM — M51.36 DEGENERATIVE LUMBAR DISC: Status: ACTIVE | Noted: 2022-07-18

## 2022-07-18 RX ORDER — MELOXICAM 7.5 MG/1
7.5 TABLET ORAL DAILY
Qty: 30 TABLET | Refills: 0 | Status: SHIPPED | OUTPATIENT
Start: 2022-07-18

## 2022-08-16 ENCOUNTER — TELEPHONE (OUTPATIENT)
Dept: NEUROSURGERY | Facility: CLINIC | Age: 57
End: 2022-08-16

## 2022-08-16 NOTE — TELEPHONE ENCOUNTER
Caller: ANIBAL    Relationship: [facility]    Best call back number: [phone]    PATIENT WAS REFERRED TO [facility]. ANIBAL IS CALLING FOR INFORMATION REGARDING PATIENT'S WC CLAIM. THERE IS NO WC CLAIM IN PATIENT'S CHART, SO I WAS UNABLE TO PROVIDE THIS.     HOWEVER, ANIBAL PROVIDED MOST OF THE INFORMATION TO ME:   [employer]   CLAIM # [id number]    MICHELINE BELCHER (CONTACT INFO IS WHAT ANIBAL IS LOOKING FOR)   DOI 7/8/22   ADJ ANGELA VERGARA ([phone], [email or web address])   ADJ'S SUPERVISOR YARA CORREA ([phone])    ANIBAL STATES PATIENT WAS ADVISED BY HER EMPLOYER TO USE HER PERSONAL INSURANCE UNTIL WC IS APPROVED, BUT [facility] NEEDS CONFIRMATION OF APPROVAL OR NO AND ARE UNABLE TO CONTACT [employer].    WITH ANY OTHER QUESTIONS AND/OR IF WC CONTACT INFO IS AVAILABLE, PLEASE CALL ANIBAL AT THE NUMBER ABOVE OR EMAIL AT [email or web address].

## 2022-08-17 NOTE — TELEPHONE ENCOUNTER
I have talked with Chelsey at [facility] they were requesting we check our records for workers comp information.   I have checked her chart and the referral that brought her to us and cannot find anything about workers comp.  They will call back if they need anything further.

## 2022-11-17 ENCOUNTER — TRANSCRIBE ORDERS (OUTPATIENT)
Dept: ADMINISTRATIVE | Facility: HOSPITAL | Age: 57
End: 2022-11-17

## 2022-11-17 DIAGNOSIS — Z12.31 VISIT FOR SCREENING MAMMOGRAM: Primary | ICD-10-CM

## 2022-12-30 ENCOUNTER — APPOINTMENT (OUTPATIENT)
Dept: MAMMOGRAPHY | Facility: HOSPITAL | Age: 57
End: 2022-12-30

## 2023-03-31 ENCOUNTER — TRANSCRIBE ORDERS (OUTPATIENT)
Dept: ADMINISTRATIVE | Facility: HOSPITAL | Age: 58
End: 2023-03-31
Payer: MEDICAID

## 2023-03-31 DIAGNOSIS — Z12.31 SCREENING MAMMOGRAM FOR BREAST CANCER: Primary | ICD-10-CM

## 2023-06-13 ENCOUNTER — HOSPITAL ENCOUNTER (OUTPATIENT)
Dept: MAMMOGRAPHY | Facility: HOSPITAL | Age: 58
Discharge: HOME | End: 2023-06-13
Admitting: NURSE PRACTITIONER
Payer: MEDICAID

## 2023-06-13 DIAGNOSIS — Z12.31 SCREENING MAMMOGRAM FOR BREAST CANCER: ICD-10-CM

## 2023-06-13 PROCEDURE — 77063 BREAST TOMOSYNTHESIS BI: CPT

## 2023-06-13 PROCEDURE — 77067 SCR MAMMO BI INCL CAD: CPT

## 2024-10-03 RX ORDER — SODIUM PICOSULFATE, MAGNESIUM OXIDE, AND ANHYDROUS CITRIC ACID 12; 3.5; 1 G/175ML; G/175ML; MG/175ML
350 LIQUID ORAL TAKE AS DIRECTED
Qty: 350 ML | Refills: 0 | Status: SHIPPED | OUTPATIENT
Start: 2024-10-03 | End: 2024-10-04

## 2024-10-07 ENCOUNTER — APPOINTMENT (OUTPATIENT)
Dept: GENERAL RADIOLOGY | Facility: HOSPITAL | Age: 59
End: 2024-10-07

## 2024-10-07 ENCOUNTER — HOSPITAL ENCOUNTER (EMERGENCY)
Facility: HOSPITAL | Age: 59
Discharge: HOME | End: 2024-10-07
Attending: EMERGENCY MEDICINE | Admitting: EMERGENCY MEDICINE

## 2024-10-07 VITALS
DIASTOLIC BLOOD PRESSURE: 111 MMHG | OXYGEN SATURATION: 98 % | HEART RATE: 91 BPM | WEIGHT: 165 LBS | BODY MASS INDEX: 29.23 KG/M2 | TEMPERATURE: 99.3 F | HEIGHT: 63 IN | RESPIRATION RATE: 18 BRPM | SYSTOLIC BLOOD PRESSURE: 169 MMHG

## 2024-10-07 DIAGNOSIS — B34.8 RHINOVIRUS: Primary | ICD-10-CM

## 2024-10-07 DIAGNOSIS — I10 ELEVATED BLOOD PRESSURE READING WITH DIAGNOSIS OF HYPERTENSION: ICD-10-CM

## 2024-10-07 LAB
ALBUMIN SERPL-MCNC: 4.5 G/DL (ref 3.5–5.2)
ALBUMIN/GLOB SERPL: 1.4 G/DL
ALP SERPL-CCNC: 63 U/L (ref 39–117)
ALT SERPL W P-5'-P-CCNC: 17 U/L (ref 1–33)
ANION GAP SERPL CALCULATED.3IONS-SCNC: 8 MMOL/L (ref 5–15)
AST SERPL-CCNC: 20 U/L (ref 1–32)
B PARAPERT DNA SPEC QL NAA+PROBE: NOT DETECTED
B PERT DNA SPEC QL NAA+PROBE: NOT DETECTED
BASOPHILS # BLD AUTO: 0.03 10*3/MM3 (ref 0–0.2)
BASOPHILS NFR BLD AUTO: 0.3 % (ref 0–1.5)
BILIRUB SERPL-MCNC: 0.4 MG/DL (ref 0–1.2)
BUN SERPL-MCNC: 14 MG/DL (ref 6–20)
BUN/CREAT SERPL: 15.4 (ref 7–25)
C PNEUM DNA NPH QL NAA+NON-PROBE: NOT DETECTED
CALCIUM SPEC-SCNC: 9.5 MG/DL (ref 8.6–10.5)
CHLORIDE SERPL-SCNC: 105 MMOL/L (ref 98–107)
CO2 SERPL-SCNC: 28 MMOL/L (ref 22–29)
CREAT SERPL-MCNC: 0.91 MG/DL (ref 0.57–1)
DEPRECATED RDW RBC AUTO: 41.5 FL (ref 37–54)
EGFRCR SERPLBLD CKD-EPI 2021: 73.7 ML/MIN/1.73
EOSINOPHIL # BLD AUTO: 0.09 10*3/MM3 (ref 0–0.4)
EOSINOPHIL NFR BLD AUTO: 1 % (ref 0.3–6.2)
ERYTHROCYTE [DISTWIDTH] IN BLOOD BY AUTOMATED COUNT: 12.5 % (ref 12.3–15.4)
FLUAV SUBTYP SPEC NAA+PROBE: NOT DETECTED
FLUBV RNA ISLT QL NAA+PROBE: NOT DETECTED
GLOBULIN UR ELPH-MCNC: 3.3 GM/DL
GLUCOSE SERPL-MCNC: 100 MG/DL (ref 65–99)
HADV DNA SPEC NAA+PROBE: NOT DETECTED
HCOV 229E RNA SPEC QL NAA+PROBE: NOT DETECTED
HCOV HKU1 RNA SPEC QL NAA+PROBE: NOT DETECTED
HCOV NL63 RNA SPEC QL NAA+PROBE: NOT DETECTED
HCOV OC43 RNA SPEC QL NAA+PROBE: NOT DETECTED
HCT VFR BLD AUTO: 41.5 % (ref 34–46.6)
HGB BLD-MCNC: 14 G/DL (ref 12–15.9)
HMPV RNA NPH QL NAA+NON-PROBE: NOT DETECTED
HPIV1 RNA ISLT QL NAA+PROBE: NOT DETECTED
HPIV2 RNA SPEC QL NAA+PROBE: NOT DETECTED
HPIV3 RNA NPH QL NAA+PROBE: NOT DETECTED
HPIV4 P GENE NPH QL NAA+PROBE: NOT DETECTED
IMM GRANULOCYTES # BLD AUTO: 0.01 10*3/MM3 (ref 0–0.05)
IMM GRANULOCYTES NFR BLD AUTO: 0.1 % (ref 0–0.5)
LYMPHOCYTES # BLD AUTO: 1.66 10*3/MM3 (ref 0.7–3.1)
LYMPHOCYTES NFR BLD AUTO: 18.8 % (ref 19.6–45.3)
M PNEUMO IGG SER IA-ACNC: NOT DETECTED
MAGNESIUM SERPL-MCNC: 1.8 MG/DL (ref 1.6–2.6)
MCH RBC QN AUTO: 30.4 PG (ref 26.6–33)
MCHC RBC AUTO-ENTMCNC: 33.7 G/DL (ref 31.5–35.7)
MCV RBC AUTO: 90 FL (ref 79–97)
MONOCYTES # BLD AUTO: 0.58 10*3/MM3 (ref 0.1–0.9)
MONOCYTES NFR BLD AUTO: 6.6 % (ref 5–12)
NEUTROPHILS NFR BLD AUTO: 6.47 10*3/MM3 (ref 1.7–7)
NEUTROPHILS NFR BLD AUTO: 73.2 % (ref 42.7–76)
NRBC BLD AUTO-RTO: 0 /100 WBC (ref 0–0.2)
NT-PROBNP SERPL-MCNC: 40.1 PG/ML (ref 0–900)
PLATELET # BLD AUTO: 286 10*3/MM3 (ref 140–450)
PMV BLD AUTO: 10.6 FL (ref 6–12)
POTASSIUM SERPL-SCNC: 3.5 MMOL/L (ref 3.5–5.2)
PROT SERPL-MCNC: 7.8 G/DL (ref 6–8.5)
RBC # BLD AUTO: 4.61 10*6/MM3 (ref 3.77–5.28)
RHINOVIRUS RNA SPEC NAA+PROBE: DETECTED
RSV RNA NPH QL NAA+NON-PROBE: NOT DETECTED
SARS-COV-2 RNA NPH QL NAA+NON-PROBE: NOT DETECTED
SODIUM SERPL-SCNC: 141 MMOL/L (ref 136–145)
T4 FREE SERPL-MCNC: 0.97 NG/DL (ref 0.92–1.68)
TSH SERPL DL<=0.05 MIU/L-ACNC: 1.35 UIU/ML (ref 0.27–4.2)
WBC NRBC COR # BLD AUTO: 8.84 10*3/MM3 (ref 3.4–10.8)

## 2024-10-07 PROCEDURE — 71045 X-RAY EXAM CHEST 1 VIEW: CPT

## 2024-10-07 PROCEDURE — 80053 COMPREHEN METABOLIC PANEL: CPT | Performed by: EMERGENCY MEDICINE

## 2024-10-07 PROCEDURE — 84439 ASSAY OF FREE THYROXINE: CPT | Performed by: EMERGENCY MEDICINE

## 2024-10-07 PROCEDURE — 83880 ASSAY OF NATRIURETIC PEPTIDE: CPT | Performed by: EMERGENCY MEDICINE

## 2024-10-07 PROCEDURE — 85025 COMPLETE CBC W/AUTO DIFF WBC: CPT | Performed by: EMERGENCY MEDICINE

## 2024-10-07 PROCEDURE — 83735 ASSAY OF MAGNESIUM: CPT | Performed by: EMERGENCY MEDICINE

## 2024-10-07 PROCEDURE — 0202U NFCT DS 22 TRGT SARS-COV-2: CPT | Performed by: EMERGENCY MEDICINE

## 2024-10-07 PROCEDURE — 84443 ASSAY THYROID STIM HORMONE: CPT | Performed by: EMERGENCY MEDICINE

## 2024-10-07 PROCEDURE — 99283 EMERGENCY DEPT VISIT LOW MDM: CPT

## 2024-10-07 RX ORDER — AMLODIPINE BESYLATE 5 MG/1
5 TABLET ORAL DAILY
Qty: 30 TABLET | Refills: 0 | Status: SHIPPED | OUTPATIENT
Start: 2024-10-07 | End: 2024-11-06

## 2024-10-07 RX ORDER — AMLODIPINE BESYLATE 5 MG/1
5 TABLET ORAL DAILY
COMMUNITY
End: 2024-10-07

## 2024-10-07 RX ORDER — AMLODIPINE BESYLATE 5 MG/1
10 TABLET ORAL DAILY
Qty: 60 TABLET | Refills: 0 | Status: SHIPPED | OUTPATIENT
Start: 2024-10-07 | End: 2024-11-06

## 2024-10-07 RX ORDER — AMLODIPINE BESYLATE 5 MG/1
5 TABLET ORAL
Status: DISCONTINUED | OUTPATIENT
Start: 2024-10-07 | End: 2024-10-07 | Stop reason: HOSPADM

## 2024-10-07 RX ADMIN — AMLODIPINE BESYLATE 5 MG: 5 TABLET ORAL at 15:00

## 2024-10-07 NOTE — ED PROVIDER NOTES
Subjective   History of Present Illness  59-year-old female presents for evaluation of multiple complaints.  Of note, the patient has a history of hypertension.  She tells me that over the past few days she has not felt well.  She endorses compliance with her antihypertensive medications.  Over that span she has been experiencing congestion, mild cough, sore throat, ear pain, and an overall sensation of not feeling well.  She endorses a mild headache as well.  She tells me that she has been checking her blood pressures at home over the past few days and notes that they have been consistently elevated with systolic readings greater than 160 and diastolic readings greater than 100.  She denies any dizziness.  No vision changes.  No chest pain.  Given her ongoing symptoms, she came here to the ED to be evaluated.      Review of Systems   HENT:  Positive for congestion, ear pain and sore throat.    Respiratory:  Positive for cough.    Neurological:  Positive for headaches.   All other systems reviewed and are negative.      Past Medical History:   Diagnosis Date    GERD (gastroesophageal reflux disease)     Hypertension        Allergies   Allergen Reactions    Aspirin Dystonia    Codeine Paresthesia    Morphine And Codeine Mental Status Change       Past Surgical History:   Procedure Laterality Date    BREAST BIOPSY Right 2016    us core rt 10:00    BREAST CYST EXCISION Bilateral     HYSTERECTOMY      OOPHORECTOMY      US GUIDED FINE NEEDLE ASPIRATION  3/20/2018       Family History   Problem Relation Age of Onset    Breast cancer Maternal Grandmother 53    Breast cancer Maternal Aunt     Ovarian cancer Neg Hx        Social History     Socioeconomic History    Marital status:    Tobacco Use    Smoking status: Never    Smokeless tobacco: Never   Vaping Use    Vaping status: Never Used   Substance and Sexual Activity    Alcohol use: No    Drug use: No    Sexual activity: Defer           Objective   Physical  "Exam  Vitals and nursing note reviewed.   Constitutional:       General: She is not in acute distress.     Appearance: She is well-developed. She is not diaphoretic.      Comments: Nontoxic-appearing female   HENT:      Head: Normocephalic and atraumatic.      Comments: Tympanic membranes are unremarkable in appearance, posterior oropharynx is clear, uvula is midline, normal voice, no tonsillar exudates present  Eyes:      Pupils: Pupils are equal, round, and reactive to light.   Neck:      Comments: No meningeal signs or nuchal rigidity  Cardiovascular:      Rate and Rhythm: Normal rate and regular rhythm.      Heart sounds: Normal heart sounds. No murmur heard.     No friction rub. No gallop.   Pulmonary:      Effort: Pulmonary effort is normal. No respiratory distress.      Breath sounds: Normal breath sounds. No wheezing or rales.   Abdominal:      General: Bowel sounds are normal. There is no distension.      Palpations: Abdomen is soft. There is no mass.      Tenderness: There is no abdominal tenderness. There is no guarding or rebound.   Musculoskeletal:         General: Normal range of motion.      Cervical back: Neck supple.   Skin:     General: Skin is warm and dry.      Findings: No erythema or rash.   Neurological:      Mental Status: She is alert and oriented to person, place, and time.   Psychiatric:         Mood and Affect: Mood normal.         Thought Content: Thought content normal.         Judgment: Judgment normal.         Procedures           ED Course  ED Course as of 10/07/24 1959   Mon Oct 07, 2024   1955 59-year-old female presents for evaluation of \"cold-like symptoms\" for the past 3 days.  She also has a history of hypertension and notes that her blood pressure has been elevated at home over the same span.  On arrival to the ED, the patient is nontoxic-appearing.  She is hypertensive with a blood pressure of 169/111.  Unremarkable exam.  Labs are bland/unrevealing.  No evidence of endorgan " damage present.  Respiratory viral panel is positive for human rhinovirus.  Clinical presentation is clearly not consistent with hypertensive emergency.  No indication to emergently lower the patient's blood pressure at this time.  Given her consistently elevated blood pressure readings, we will increase her amlodipine from 5 mg daily to 10 mg daily.  She will continue to monitor her blood pressure at home and will record/log readings twice daily over the next week before following up with her primary care physician to discuss potential tweaks to her medication regimen.  Counseled regarding symptomatic management of viral illness.  Agreeable with plan and given appropriate strict return precautions. [DD]      ED Course User Index  [DD] Norman Robles MD                                 Recent Results (from the past 24 hour(s))   Respiratory Panel PCR w/COVID-19(SARS-CoV-2) ANDREA/JEYSON/AMA/PAD/COR/ANGELES In-House, NP Swab in UTM/VTM, 2 HR TAT - Swab, Nasopharynx    Collection Time: 10/07/24 12:50 PM    Specimen: Nasopharynx; Swab   Result Value Ref Range    ADENOVIRUS, PCR Not Detected Not Detected    Coronavirus 229E Not Detected Not Detected    Coronavirus HKU1 Not Detected Not Detected    Coronavirus NL63 Not Detected Not Detected    Coronavirus OC43 Not Detected Not Detected    COVID19 Not Detected Not Detected - Ref. Range    Human Metapneumovirus Not Detected Not Detected    Human Rhinovirus/Enterovirus Detected (A) Not Detected    Influenza A PCR Not Detected Not Detected    Influenza B PCR Not Detected Not Detected    Parainfluenza Virus 1 Not Detected Not Detected    Parainfluenza Virus 2 Not Detected Not Detected    Parainfluenza Virus 3 Not Detected Not Detected    Parainfluenza Virus 4 Not Detected Not Detected    RSV, PCR Not Detected Not Detected    Bordetella pertussis pcr Not Detected Not Detected    Bordetella parapertussis PCR Not Detected Not Detected    Chlamydophila pneumoniae PCR Not Detected Not  Detected    Mycoplasma pneumo by PCR Not Detected Not Detected   Comprehensive Metabolic Panel    Collection Time: 10/07/24 12:55 PM    Specimen: Blood   Result Value Ref Range    Glucose 100 (H) 65 - 99 mg/dL    BUN 14 6 - 20 mg/dL    Creatinine 0.91 0.57 - 1.00 mg/dL    Sodium 141 136 - 145 mmol/L    Potassium 3.5 3.5 - 5.2 mmol/L    Chloride 105 98 - 107 mmol/L    CO2 28.0 22.0 - 29.0 mmol/L    Calcium 9.5 8.6 - 10.5 mg/dL    Total Protein 7.8 6.0 - 8.5 g/dL    Albumin 4.5 3.5 - 5.2 g/dL    ALT (SGPT) 17 1 - 33 U/L    AST (SGOT) 20 1 - 32 U/L    Alkaline Phosphatase 63 39 - 117 U/L    Total Bilirubin 0.4 0.0 - 1.2 mg/dL    Globulin 3.3 gm/dL    A/G Ratio 1.4 g/dL    BUN/Creatinine Ratio 15.4 7.0 - 25.0    Anion Gap 8.0 5.0 - 15.0 mmol/L    eGFR 73.7 >60.0 mL/min/1.73   T4, Free    Collection Time: 10/07/24 12:55 PM    Specimen: Blood   Result Value Ref Range    Free T4 0.97 0.92 - 1.68 ng/dL   TSH    Collection Time: 10/07/24 12:55 PM    Specimen: Blood   Result Value Ref Range    TSH 1.350 0.270 - 4.200 uIU/mL   Magnesium    Collection Time: 10/07/24 12:55 PM    Specimen: Blood   Result Value Ref Range    Magnesium 1.8 1.6 - 2.6 mg/dL   BNP    Collection Time: 10/07/24 12:55 PM    Specimen: Blood   Result Value Ref Range    proBNP 40.1 0.0 - 900.0 pg/mL   CBC Auto Differential    Collection Time: 10/07/24 12:55 PM    Specimen: Blood   Result Value Ref Range    WBC 8.84 3.40 - 10.80 10*3/mm3    RBC 4.61 3.77 - 5.28 10*6/mm3    Hemoglobin 14.0 12.0 - 15.9 g/dL    Hematocrit 41.5 34.0 - 46.6 %    MCV 90.0 79.0 - 97.0 fL    MCH 30.4 26.6 - 33.0 pg    MCHC 33.7 31.5 - 35.7 g/dL    RDW 12.5 12.3 - 15.4 %    RDW-SD 41.5 37.0 - 54.0 fl    MPV 10.6 6.0 - 12.0 fL    Platelets 286 140 - 450 10*3/mm3    Neutrophil % 73.2 42.7 - 76.0 %    Lymphocyte % 18.8 (L) 19.6 - 45.3 %    Monocyte % 6.6 5.0 - 12.0 %    Eosinophil % 1.0 0.3 - 6.2 %    Basophil % 0.3 0.0 - 1.5 %    Immature Grans % 0.1 0.0 - 0.5 %    Neutrophils, Absolute  "6.47 1.70 - 7.00 10*3/mm3    Lymphocytes, Absolute 1.66 0.70 - 3.10 10*3/mm3    Monocytes, Absolute 0.58 0.10 - 0.90 10*3/mm3    Eosinophils, Absolute 0.09 0.00 - 0.40 10*3/mm3    Basophils, Absolute 0.03 0.00 - 0.20 10*3/mm3    Immature Grans, Absolute 0.01 0.00 - 0.05 10*3/mm3    nRBC 0.0 0.0 - 0.2 /100 WBC     Note: In addition to lab results from this visit, the labs listed above may include labs taken at another facility or during a different encounter within the last 24 hours. Please correlate lab times with ED admission and discharge times for further clarification of the services performed during this visit.    XR Chest 1 View   Final Result   Impression:   No acute pulmonary process         Electronically Signed: Jordy Marr MD     10/7/2024 2:21 PM EDT     Workstation ID: [id number]        Vitals:    10/07/24 1240   BP: (!) 169/111   BP Location: Left arm   Patient Position: Lying   Pulse: 91   Resp: 18   Temp: 99.3 °F (37.4 °C)   TempSrc: Oral   SpO2: 98%   Weight: 74.8 kg (165 lb)   Height: 160 cm (63\")     Medications - No data to display  ECG/EMG Results (last 24 hours)       ** No results found for the last 24 hours. **          No orders to display                   Medical Decision Making  Problems Addressed:  Elevated blood pressure reading with diagnosis of hypertension: complicated acute illness or injury  Rhinovirus: complicated acute illness or injury    Amount and/or Complexity of Data Reviewed  Labs: ordered.  Radiology: ordered.    Risk  Prescription drug management.        Final diagnoses:   Rhinovirus   Elevated blood pressure reading with diagnosis of hypertension       ED Disposition  ED Disposition       ED Disposition   Discharge    Condition   Stable    Comment   --               Linsey Rasmussen, APRN  [address] [place] [address]  [phone]    In 1 week           Medication List        Changed      * amLODIPine 5 MG tablet  Commonly known as: NORVASC  Take 1 tablet " by mouth Daily for 30 days.  What changed: You were already taking a medication with the same name, and this prescription was added. Make sure you understand how and when to take each.     * amLODIPine 5 MG tablet  Commonly known as: NORVASC  Take 2 tablets by mouth Daily for 30 days.  What changed: how much to take           * This list has 2 medication(s) that are the same as other medications prescribed for you. Read the directions carefully, and ask your doctor or other care provider to review them with you.                   Where to Get Your Medications        [facility] to [id number] - [place], [place] - [address] [place], [place] [id number] [place] [phone] [address] [address]      Phone: [phone]   amLODIPine 5 MG tablet       These medications were sent to [facility] [place], [place] - [address] [place] [address] 170 - [phone] [place] [phone]   [address] [place] [address]      Phone: [phone]   amLODIPine 5 MG tablet            Norman Robles MD  10/07/24 1959

## 2024-10-07 NOTE — Clinical Note
[facility] EMERGENCY DEPARTMENT  [address]  [facility] [address]  Phone: [phone]    Cristina Moe was seen and treated in our emergency department on 10/7/2024.  She may return to work on 10/10/2024.         Thank you for choosing [facility].    Norman Robles MD

## 2024-10-07 NOTE — Clinical Note
[facility] EMERGENCY DEPARTMENT  [address]  [facility] [address]  Phone: [phone]    Cristina Moe was seen and treated in our emergency department on 10/7/2024.  She may return to work on 10/10/2024.         Thank you for choosing [facility].    Norman Robles MD